# Patient Record
Sex: MALE | Race: WHITE | NOT HISPANIC OR LATINO
[De-identification: names, ages, dates, MRNs, and addresses within clinical notes are randomized per-mention and may not be internally consistent; named-entity substitution may affect disease eponyms.]

---

## 2017-03-09 ENCOUNTER — TRANSCRIPTION ENCOUNTER (OUTPATIENT)
Age: 27
End: 2017-03-09

## 2017-03-17 ENCOUNTER — TRANSCRIPTION ENCOUNTER (OUTPATIENT)
Age: 27
End: 2017-03-17

## 2017-12-05 ENCOUNTER — TRANSCRIPTION ENCOUNTER (OUTPATIENT)
Age: 27
End: 2017-12-05

## 2018-04-30 ENCOUNTER — TRANSCRIPTION ENCOUNTER (OUTPATIENT)
Age: 28
End: 2018-04-30

## 2018-11-27 ENCOUNTER — OUTPATIENT (OUTPATIENT)
Dept: OUTPATIENT SERVICES | Facility: HOSPITAL | Age: 28
LOS: 1 days | End: 2018-11-27

## 2018-11-27 ENCOUNTER — APPOINTMENT (OUTPATIENT)
Dept: MRI IMAGING | Facility: CLINIC | Age: 28
End: 2018-11-27
Payer: COMMERCIAL

## 2018-11-27 ENCOUNTER — EMERGENCY (EMERGENCY)
Facility: HOSPITAL | Age: 28
LOS: 1 days | Discharge: HOME | End: 2018-11-27
Attending: EMERGENCY MEDICINE
Payer: COMMERCIAL

## 2018-11-27 VITALS — WEIGHT: 259.93 LBS | HEIGHT: 70 IN

## 2018-11-27 VITALS
TEMPERATURE: 98 F | DIASTOLIC BLOOD PRESSURE: 73 MMHG | RESPIRATION RATE: 20 BRPM | OXYGEN SATURATION: 97 % | HEART RATE: 101 BPM | SYSTOLIC BLOOD PRESSURE: 131 MMHG

## 2018-11-27 DIAGNOSIS — M54.16 RADICULOPATHY, LUMBAR REGION: ICD-10-CM

## 2018-11-27 PROBLEM — Z00.00 ENCOUNTER FOR PREVENTIVE HEALTH EXAMINATION: Status: ACTIVE | Noted: 2018-11-27

## 2018-11-27 PROCEDURE — 72148 MRI LUMBAR SPINE W/O DYE: CPT | Mod: 26

## 2018-11-27 PROCEDURE — 96372 THER/PROPH/DIAG INJ SC/IM: CPT

## 2018-11-27 PROCEDURE — 99283 EMERGENCY DEPT VISIT LOW MDM: CPT

## 2018-11-27 PROCEDURE — 99284 EMERGENCY DEPT VISIT MOD MDM: CPT | Mod: 25

## 2018-11-27 RX ORDER — KETOROLAC TROMETHAMINE 30 MG/ML
1 SYRINGE (ML) INJECTION
Qty: 9 | Refills: 0 | OUTPATIENT
Start: 2018-11-27 | End: 2018-11-29

## 2018-11-27 RX ORDER — KETOROLAC TROMETHAMINE 30 MG/ML
60 SYRINGE (ML) INJECTION ONCE
Qty: 0 | Refills: 0 | Status: DISCONTINUED | OUTPATIENT
Start: 2018-11-27 | End: 2018-11-27

## 2018-11-27 RX ORDER — METHOCARBAMOL 500 MG/1
2 TABLET, FILM COATED ORAL
Qty: 24 | Refills: 0 | OUTPATIENT
Start: 2018-11-27 | End: 2018-11-29

## 2018-11-27 RX ORDER — DEXAMETHASONE 0.5 MG/5ML
10 ELIXIR ORAL ONCE
Qty: 0 | Refills: 0 | Status: COMPLETED | OUTPATIENT
Start: 2018-11-27 | End: 2018-11-27

## 2018-11-27 RX ORDER — LIDOCAINE 4 G/100G
1 CREAM TOPICAL
Qty: 10 | Refills: 1 | OUTPATIENT
Start: 2018-11-27 | End: 2018-12-16

## 2018-11-27 RX ORDER — METHOCARBAMOL 500 MG/1
1500 TABLET, FILM COATED ORAL ONCE
Qty: 0 | Refills: 0 | Status: COMPLETED | OUTPATIENT
Start: 2018-11-27 | End: 2018-11-27

## 2018-11-27 RX ADMIN — Medication 60 MILLIGRAM(S): at 15:51

## 2018-11-27 RX ADMIN — METHOCARBAMOL 1500 MILLIGRAM(S): 500 TABLET, FILM COATED ORAL at 15:51

## 2018-11-27 RX ADMIN — Medication 10 MILLIGRAM(S): at 15:51

## 2018-11-27 NOTE — ED PROVIDER NOTE - CARE PROVIDER_API CALL
Prabhu Rolle (DO), Orthopaedic Surgery  [address]  [place] [address]  Phone: [phone]  Fax: [phone]

## 2018-11-27 NOTE — ED PROVIDER NOTE - MEDICAL DECISION MAKING DETAILS
Physical exam and history consistent with acute back pain from herniated disc or other musculoskeletal cause. MRI reviewed with patient. Discussed plan for pain control and follow-up with orthopedics. Discussed alarm symptoms and to return to ED if any of these symptoms or additional symptoms of concern arise.

## 2018-11-27 NOTE — ED PROVIDER NOTE - OBJECTIVE STATEMENT
Back pain, gradual onset, 2 days ago. Started while was hunting and continued given symptoms were mild at that time. Now he reports, constant back pain that is sharp/ stabbing in quality. As bad as 10/10. Worsened when lying down and improved minimally with muscle relaxants and tramadol (which was his mother's prescriptions). Patient works as an EMT. Has had chronic back pain before and was previously recommended for an MRI by orthopedics but symptoms resolved. He still had the script and went for the MRI prior to coming to the emergency department today. No fever, chills, no bowel/ bladder incontinence. Symptoms do not awake him from night. His back is tender and he states that he has a muscle spasm. Pain does not radiate but he does have numbness and tingling in bilateral lower extremities.

## 2018-11-27 NOTE — ED PROVIDER NOTE - CONSTITUTIONAL, MLM
normal... Well appearing, well nourished, awake, alert, oriented to person, place, time/situation and in minimal distress upon awakening. (patient was comfortably sleeping initially).

## 2018-11-27 NOTE — ED PROVIDER NOTE - ATTENDING CONTRIBUTION TO CARE
Pt. with mild tenderness to lumbar region. NO acute distress. MRI report discussed with the patient. I have discussed the plan with the resident.

## 2018-11-27 NOTE — ED STATDOCS - OBJECTIVE STATEMENT
29 y/o male presents to the ED c/o back pain that onset last night. Pt notes that he went hunting yesterday and when he came to the ED Pt denies lifting anything heavy, strenuous activity, trauma, testicle pain. He notes that the pain radiates down to the thigh. Pain feels different from his hernia. Pain is worse with movement. He had an MRI done about 1 hour ago after his PCP. Took Cyclobenzaprine with no relieve.  He took a tramadol yesterday with no relieve. Denies fever, chills, numbness, tingling, or HA. No further complaints at this time. 29 y/o male presents to the ED c/o back pain that onset last night. Pt notes that he went hunting yesterday and when he came to the ED Pt denies lifting anything heavy, strenuous activity, trauma, testicle pain. He notes that the pain radiates down to the thigh. Pain feels different from his hernia. Pain is worse with movement. He had an MRI done about 1 hour ago after his PCP. Took Cyclobenzaprine yesterday with no relief.  He took a tramadol yesterday with no relief. Denies fever, chills, numbness, tingling, or HA. No further complaints at this time.    will medicate for back pain    Patient seen by me in intake for initial assessment and ordering. Physician on site to follow results and further evaluate and treat patient. Telemedicine assessment was conducted (using real time 2 way audio-video technology) by Dr. Arie Del Valle located at [address] [place] [address]  ++++++++++++++++++++++++  Pertinent patient history and initial plan:    29 y/o male presents to the ED c/o back pain that onset last night. Pt notes that he went hunting yesterday and when he came to the ED Pt denies lifting anything heavy, strenuous activity, trauma, testicle pain. He notes that the pain radiates down to the thigh. Pain feels different from his hernia. Pain is worse with movement. He had an MRI done about 1 hour ago after his PCP. Took Cyclobenzaprine yesterday with no relief.  He took a tramadol yesterday with no relief. Denies fever, chills, numbness, tingling, or HA. No further complaints at this time.    will medicate for back pain    Patient seen by me in intake for initial assessment and ordering. Physician on site to follow results and further evaluate and treat patient.

## 2019-08-02 ENCOUNTER — TRANSCRIPTION ENCOUNTER (OUTPATIENT)
Age: 29
End: 2019-08-02

## 2020-04-26 ENCOUNTER — MESSAGE (OUTPATIENT)
Age: 30
End: 2020-04-26

## 2020-05-08 LAB
SARS-COV-2 IGG SERPL IA-ACNC: 0.9 INDEX
SARS-COV-2 IGG SERPL QL IA: NEGATIVE

## 2020-08-30 ENCOUNTER — TRANSCRIPTION ENCOUNTER (OUTPATIENT)
Age: 30
End: 2020-08-30

## 2021-01-11 ENCOUNTER — TRANSCRIPTION ENCOUNTER (OUTPATIENT)
Age: 31
End: 2021-01-11